# Patient Record
Sex: FEMALE | Race: WHITE | NOT HISPANIC OR LATINO
[De-identification: names, ages, dates, MRNs, and addresses within clinical notes are randomized per-mention and may not be internally consistent; named-entity substitution may affect disease eponyms.]

---

## 2018-07-27 PROBLEM — Z00.00 ENCOUNTER FOR PREVENTIVE HEALTH EXAMINATION: Status: ACTIVE | Noted: 2018-07-27

## 2019-05-20 ENCOUNTER — APPOINTMENT (OUTPATIENT)
Dept: BREAST CENTER | Facility: CLINIC | Age: 59
End: 2019-05-20
Payer: COMMERCIAL

## 2019-05-20 VITALS
DIASTOLIC BLOOD PRESSURE: 78 MMHG | HEART RATE: 61 BPM | SYSTOLIC BLOOD PRESSURE: 113 MMHG | HEIGHT: 62 IN | BODY MASS INDEX: 23.74 KG/M2 | WEIGHT: 129 LBS

## 2019-05-20 DIAGNOSIS — Z86.39 PERSONAL HISTORY OF OTHER ENDOCRINE, NUTRITIONAL AND METABOLIC DISEASE: ICD-10-CM

## 2019-05-20 DIAGNOSIS — Z80.7 FAMILY HISTORY OF OTHER MALIGNANT NEOPLASMS OF LYMPHOID, HEMATOPOIETIC AND RELATED TISSUES: ICD-10-CM

## 2019-05-20 DIAGNOSIS — Z12.31 ENCOUNTER FOR SCREENING MAMMOGRAM FOR MALIGNANT NEOPLASM OF BREAST: ICD-10-CM

## 2019-05-20 PROCEDURE — 99215 OFFICE O/P EST HI 40 MIN: CPT

## 2019-05-20 RX ORDER — FEXOFENADINE HCL 60 MG
CAPSULE ORAL
Refills: 0 | Status: ACTIVE | COMMUNITY

## 2019-05-20 RX ORDER — MONTELUKAST SODIUM 10 MG/1
TABLET, FILM COATED ORAL
Refills: 0 | Status: ACTIVE | COMMUNITY

## 2019-05-20 RX ORDER — OMEPRAZOLE MAGNESIUM 20 MG/1
TABLET, DELAYED RELEASE ORAL
Refills: 0 | Status: ACTIVE | COMMUNITY

## 2019-05-20 RX ORDER — LEVOTHYROXINE SODIUM 137 UG/1
TABLET ORAL
Refills: 0 | Status: ACTIVE | COMMUNITY

## 2019-05-20 NOTE — ASSESSMENT
[FreeTextEntry1] : 60 yo female with h/o LCIS\par due for MRI AUG 2019\par plan mg/sono FEB 2020\par We reviewed risk reduction strategies including maintaining a BMI <25, limiting red meat intake and alcoholic beverages to 3 per week and exercise (150 min/ week low intensity or 75 min/week high intensity). And maintaining a normal vitamin D level.\par \par seeing new gynecologist JUN 2019 she will discuss HRT with her and try alternative methods\par f/u me 6 months \par She knows to call or return sooner should any concerns or questions arise.\par

## 2019-05-20 NOTE — HISTORY OF PRESENT ILLNESS
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 Patient is s/p left breast stereotactic biopsy on 01/05/2017. Pathology showed cystic changes with apocrine metaplasia and associated luminal calcifications and a fibroadenomatoid nodule with scattered calcifications and atrophic lobules with luminal calcifications. She has a "spot on her spine" that is being followed by a doctor in [facility]. She says they don't know what it is. It is in sacrum. She has since seen Dr. Nair and will get another MRI in 2 years. \par \par Her mother had a massive hemorrhagic stroke and is currently stable. Her 87 yo father passed in September 2019.\par \par Patient has no breast complaints today.  \par \par She does SBE irregularly. \par She has not noticed a change in her breast or a breast lump.\par She has not noticed a change in her nipple or nipple area.\par She has not noticed a change in the skin of the breast.\par She is not experiencing nipple discharge.\par She is not experiencing breast pain.\par She has not noticed a lump or lymph node under the armpit. \par \par BREAST CANCER RISK FACTORS\par Menarche:  17\par Date of LMP: 2009 (ablation)\par Menopause: post \par Grav:  3    Para:  3\par Age at first live birth: 27\par Nursed: no\par Hysterectomy: no\par Oophorectomy: no\par OCP: yes 10 years\par HRT: no\par Last pap/pelvic exam: 09/2017  WNL\par Related family history: maternal aunt BCA@80, patrnal 1st cousin @48 \par Ashkenazi: No\par Tyrer-Job/NCI lifetime risk:  BRCAPRO 9.6%, TCv6 49.7%, TCv7 62%, Florina 10%, Wm is not applicable\par BRCA testing: No\par Bra size:  32A\par \par Last mammogram:   2/12/2019         Location: [place]\par Report reviewed.                                 Images reviewed on PACS.\par Results: Birads 2\par Heterogeneously dense breast. No evidence of malignancy \par \par Last ultrasound:      2/12/2019         Location: [place]\par Report reviewed.                                 Images reviewed on PACS.\par Results: Birads 2\par No evidence of malignancy \par \par \par Last MRI:     8/13/2018               Location: [facility]\par Report reviewed.\par Results: Birads 2\par No evidence of malignancy

## 2019-05-20 NOTE — REVIEW OF SYSTEMS
[Feeling Tired] : feeling tired [Recent Weight Gain (___ Lbs)] : recent [unfilled] ~Ulb weight gain [Night Sweats] : night sweats [Nasal Discharge] : nasal discharge [Palpitations] : palpitations [Lower Ext Edema] : lower extremity edema [Heartburn] : heartburn [Anxiety] : anxiety [Heat Intolerance] : intolerance to heat [Negative] : Heme/Lymph [de-identified] : stress

## 2019-05-20 NOTE — PHYSICAL EXAM
[Normocephalic] : normocephalic [Atraumatic] : atraumatic [Supple] : supple [No Supraclavicular Adenopathy] : no supraclavicular adenopathy [Examined in the supine and seated position] : examined in the supine and seated position [No dominant masses] : no dominant masses in right breast  [No dominant masses] : no dominant masses left breast [No Nipple Retraction] : no left nipple retraction [No Nipple Discharge] : no left nipple discharge [No Axillary Lymphadenopathy] : no left axillary lymphadenopathy [No Edema] : no edema [No Rashes] : no rashes [No Ulceration] : no ulceration [de-identified] : healed periareolar incision

## 2020-10-05 ENCOUNTER — APPOINTMENT (OUTPATIENT)
Dept: BREAST CENTER | Facility: CLINIC | Age: 60
End: 2020-10-05
Payer: COMMERCIAL

## 2020-10-05 VITALS
SYSTOLIC BLOOD PRESSURE: 133 MMHG | HEART RATE: 67 BPM | BODY MASS INDEX: 23.74 KG/M2 | WEIGHT: 129 LBS | DIASTOLIC BLOOD PRESSURE: 84 MMHG | HEIGHT: 62 IN

## 2020-10-05 DIAGNOSIS — R92.1 MAMMOGRAPHIC CALCIFICATION FOUND ON DIAGNOSTIC IMAGING OF BREAST: ICD-10-CM

## 2020-10-05 DIAGNOSIS — Z80.3 FAMILY HISTORY OF MALIGNANT NEOPLASM OF BREAST: ICD-10-CM

## 2020-10-05 DIAGNOSIS — Z80.8 FAMILY HISTORY OF MALIGNANT NEOPLASM OF OTHER ORGANS OR SYSTEMS: ICD-10-CM

## 2020-10-05 DIAGNOSIS — Z80.0 FAMILY HISTORY OF MALIGNANT NEOPLASM OF DIGESTIVE ORGANS: ICD-10-CM

## 2020-10-05 DIAGNOSIS — Z78.9 OTHER SPECIFIED HEALTH STATUS: ICD-10-CM

## 2020-10-05 PROCEDURE — 99215 OFFICE O/P EST HI 40 MIN: CPT

## 2020-10-05 RX ORDER — METHYLPREDNISOLONE 32 MG/1
32 TABLET ORAL
Qty: 3 | Refills: 0 | Status: DISCONTINUED | COMMUNITY
Start: 2018-08-09 | End: 2020-10-05

## 2020-10-05 NOTE — REVIEW OF SYSTEMS
[Feeling Tired] : feeling tired [Recent Weight Gain (___ Lbs)] : recent [unfilled] ~Ulb weight gain [Night Sweats] : night sweats [Nasal Discharge] : nasal discharge [Palpitations] : palpitations [Lower Ext Edema] : lower extremity edema [Heartburn] : heartburn [Anxiety] : anxiety [Heat Intolerance] : intolerance to heat [Negative] : Heme/Lymph [de-identified] : stress

## 2020-10-05 NOTE — PHYSICAL EXAM
[Normocephalic] : normocephalic [Atraumatic] : atraumatic [Supple] : supple [No Supraclavicular Adenopathy] : no supraclavicular adenopathy [Examined in the supine and seated position] : examined in the supine and seated position [No dominant masses] : no dominant masses in right breast  [No dominant masses] : no dominant masses left breast [No Nipple Retraction] : no left nipple retraction [No Nipple Discharge] : no left nipple discharge [No Axillary Lymphadenopathy] : no left axillary lymphadenopathy [No Edema] : no edema [No Rashes] : no rashes [No Ulceration] : no ulceration [de-identified] : healed periareolar incision

## 2020-10-05 NOTE — HISTORY OF PRESENT ILLNESS
[FreeTextEntry1] : This is a 60 year old female s/p left partial mastectomy 3/2013 for lobular carcinoma in situ. Patient is s/p left breast stereotactic biopsy on 01/05/2017. Pathology showed cystic changes with apocrine metaplasia and associated luminal calcifications and a fibroadenomatoid nodule with scattered calcifications and atrophic lobules with luminal calcifications. She has a "spot on her spine" that [facility] She says they don't know what it is. It is in sacrum. She has since seen Dr. Nair and will get another MRI in 2 years. \par \par Her mother had a massive hemorrhagic stroke and is currently stable. Her 87 yo father passed in September 2019. Her  passed away 2/2020.\par \par Patient has no breast complaints today.  \par \par She does SBE irregularly. \par She has not noticed a change in her breast or a breast lump.\par She has not noticed a change in her nipple or nipple area.\par She has not noticed a change in the skin of the breast.\par She is not experiencing nipple discharge.\par She is not experiencing breast pain.\par She has not noticed a lump or lymph node under the armpit. \par \par BREAST CANCER RISK FACTORS\par Menarche:  17\par Date of LMP: 2009 (ablation)\par Menopause: post \par Grav:  3    Para:  3\par Age at first live birth: 27\par Nursed: no\par Hysterectomy: no\par Oophorectomy: no\par OCP: yes 10 years\par HRT: no\par Last pap/pelvic exam: 6/2020  WNL\par Related family history: maternal aunt BCA@80, patrnal 1st cousin @48 \par Ashkenazi: No\par Tyrer-[place]/NCI lifetime risk:  BRCAPRO 9.6%, TCv6 49.7%, TCv7 62%, Florina 10%, Wm is not applicable\par BRCA testing: No\par Bra size:  32A\par \par Last mammogram: 9/25/2020 right         Location[place]\par Report reviewed.                                 Images reviewed on PACS.\par Results: Birads 2\par Heterogeneously dense breast. Stable mammographic findings. \par \par Last ultrasound:      2/12/2019         Location: [place]\par Report reviewed.                                 Images reviewed on PACS.\par Results: Birads 2\par No evidence of malignancy \par \par \par Last MRI:     8/05/2020              Location: [facility]\par Report reviewed.\par Results: Birads 2\par No evidence of malignancy.

## 2020-10-05 NOTE — ASSESSMENT
[FreeTextEntry1] : 61 yo female with h/o LCIS\par due for MRI SEP 2021\par plan mg/sono MAR 2021\par We reviewed risk reduction strategies including maintaining a BMI <25, limiting red meat intake and alcoholic beverages to 3 per week and exercise (150 min/ week low intensity or 75 min/week high intensity). And maintaining a normal vitamin D level.\par \par saw Dr. Karimi JUN 2019 and HRT was not advised\par f/u me 6 months \par She knows to call or return sooner should any concerns or questions arise.\par

## 2021-03-19 ENCOUNTER — NON-APPOINTMENT (OUTPATIENT)
Age: 61
End: 2021-03-19

## 2021-03-19 DIAGNOSIS — R92.8 OTHER ABNORMAL AND INCONCLUSIVE FINDINGS ON DIAGNOSTIC IMAGING OF BREAST: ICD-10-CM

## 2021-05-04 ENCOUNTER — NON-APPOINTMENT (OUTPATIENT)
Age: 61
End: 2021-05-04

## 2021-05-04 ENCOUNTER — APPOINTMENT (OUTPATIENT)
Dept: BREAST CENTER | Facility: CLINIC | Age: 61
End: 2021-05-04
Payer: COMMERCIAL

## 2021-05-04 VITALS
HEART RATE: 64 BPM | HEIGHT: 62 IN | BODY MASS INDEX: 23.74 KG/M2 | WEIGHT: 129 LBS | SYSTOLIC BLOOD PRESSURE: 108 MMHG | DIASTOLIC BLOOD PRESSURE: 71 MMHG

## 2021-05-04 PROCEDURE — 99215 OFFICE O/P EST HI 40 MIN: CPT

## 2021-05-04 PROCEDURE — 99072 ADDL SUPL MATRL&STAF TM PHE: CPT

## 2021-05-04 RX ORDER — DOXYCYCLINE HYCLATE 50 MG/1
CAPSULE ORAL
Refills: 0 | Status: DISCONTINUED | COMMUNITY
End: 2021-05-04

## 2021-05-04 NOTE — HISTORY OF PRESENT ILLNESS
[FreeTextEntry1] : This is a 61 year old female s/p left partial mastectomy 3/2013 for lobular carcinoma in situ. Patient is s/p left breast stereotactic biopsy on 01/05/2017. Pathology showed cystic changes with apocrine metaplasia and associated luminal calcifications and a fibroadenomatoid nodule with scattered calcifications and atrophic lobules with luminal calcifications. She has a "spot on her spine" that is being followed by a doctor in [facility]. She says they don't know what it is. It is in sacrum. She has since seen Dr. Nair and will get another MRI in 2 years. \par \par Her mother had a massive hemorrhagic stroke and is currently stable. Her 89 yo father passed in September 2019. Her  passed away 2/2020.\par \par Patient has no breast complaints today.  She completed Pfizer left arm April 2021. She is s/p right breast LIQ stereo bx 3/23/2021 path was benign.\par \par She does SBE irregularly. \par She has not noticed a change in her breast or a breast lump.\par She has not noticed a change in her nipple or nipple area.\par She has not noticed a change in the skin of the breast.\par She is not experiencing nipple discharge.\par She is not experiencing breast pain.\par She has not noticed a lump or lymph node under the armpit. \par \par BREAST CANCER RISK FACTORS\par Menarche:  17\par Date of LMP: 2009 (ablation)\par Menopause: post \par Grav:  3    Para:  3\par Age at first live birth: 27\par Nursed: no\par Hysterectomy: no\par Oophorectomy: no\par OCP: yes 10 years\par HRT: no\par Last pap/pelvic exam: 6/2020 WNL\par Related family history: maternal aunt BCA@80, patrnal 1st cousin @48 \par Ashkenazi: No\par Tyrer-[place]/NCI lifetime risk:  BRCAPRO 9.6%, TCv6 49.7%, TCv7 62%, Florina 10%, Wm is not applicable\par BRCA testing: No\par Bra size:  32A\par \par Last mammogram: 3/18/2021         Location: [place]\par Report reviewed.                                 Images reviewed on PACS.\par Results: Birads 4B\par Heterogeneously dense breast. At the inner aspect of the right breast there is now a 4 x 3 mm circumscribed mass with associated microcalcifications. Stereotactic/tomosynthesis guided biopsy is recc. The microcalcifications at the UOA of the right breast are stable and likely benign. No mammographic evidence of malignancy on the left. \par \par Last ultrasound:   3/18/2021         Location: [place]\par Report reviewed.                                 Images reviewed on PACS.\par Results: Birads 4B\par No sonographic evidence of malignancy. \par \par Last MRI:     8/05/2020              Location: [facility]\par Report reviewed.\par Results: Birads 2\par No evidence of malignancy.

## 2021-05-04 NOTE — PHYSICAL EXAM
[Normocephalic] : normocephalic [Atraumatic] : atraumatic [Supple] : supple [No Supraclavicular Adenopathy] : no supraclavicular adenopathy [Examined in the supine and seated position] : examined in the supine and seated position [No dominant masses] : no dominant masses in right breast  [No dominant masses] : no dominant masses left breast [No Nipple Retraction] : no left nipple retraction [No Nipple Discharge] : no left nipple discharge [No Axillary Lymphadenopathy] : no left axillary lymphadenopathy [No Edema] : no edema [No Rashes] : no rashes [No Ulceration] : no ulceration [Symmetrical] : symmetrical [de-identified] : healed periareolar incision

## 2021-05-04 NOTE — REVIEW OF SYSTEMS
[Feeling Tired] : feeling tired [Recent Weight Gain (___ Lbs)] : recent [unfilled] ~Ulb weight gain [Night Sweats] : night sweats [Nasal Discharge] : nasal discharge [Palpitations] : palpitations [Lower Ext Edema] : lower extremity edema [Heartburn] : heartburn [Anxiety] : anxiety [Heat Intolerance] : intolerance to heat [Negative] : Heme/Lymph [de-identified] : stress

## 2022-09-19 ENCOUNTER — NON-APPOINTMENT (OUTPATIENT)
Age: 62
End: 2022-09-19

## 2022-10-18 ENCOUNTER — RESULT REVIEW (OUTPATIENT)
Age: 62
End: 2022-10-18

## 2023-01-24 ENCOUNTER — APPOINTMENT (OUTPATIENT)
Dept: BREAST CENTER | Facility: CLINIC | Age: 63
End: 2023-01-24
Payer: COMMERCIAL

## 2023-01-24 VITALS
WEIGHT: 129 LBS | BODY MASS INDEX: 23.74 KG/M2 | SYSTOLIC BLOOD PRESSURE: 129 MMHG | DIASTOLIC BLOOD PRESSURE: 80 MMHG | HEART RATE: 63 BPM | HEIGHT: 62 IN

## 2023-01-24 DIAGNOSIS — D05.02 LOBULAR CARCINOMA IN SITU OF LEFT BREAST: ICD-10-CM

## 2023-01-24 PROCEDURE — 99214 OFFICE O/P EST MOD 30 MIN: CPT

## 2023-01-24 NOTE — HISTORY OF PRESENT ILLNESS
[FreeTextEntry1] : This is a 62 year old female s/p left partial mastectomy 3/2013 for lobular carcinoma in situ. Patient is s/p left breast stereotactic biopsy on 01/05/2017. Pathology showed cystic changes with apocrine metaplasia and associated luminal calcifications and a fibroadenomatoid nodule with scattered calcifications and atrophic lobules with luminal calcifications. She has a "spot on her spine" that is being followed by a doctor in [facility]. She says they don't know what it is. It is in sacrum. She has since seen Dr. Nair and will get another MRI in 2 years. \par \par Her mother had a massive hemorrhagic stroke and is currently stable. Her 89 yo father passed in September 2019. Her  passed away 2/2020.\par \par Patient has no breast complaints today.  She completed Pfizer left arm April 2021. She is s/p right breast LIQ stereo bx 3/23/2021 path was benign.\par \par She does SBE irregularly. \par She has not noticed a change in her breast or a breast lump.\par She has not noticed a change in her nipple or nipple area.\par She has not noticed a change in the skin of the breast.\par She is not experiencing nipple discharge.\par She is not experiencing breast pain.\par She has not noticed a lump or lymph node under the armpit. \par \par BREAST CANCER RISK FACTORS\par Menarche:  17\par Date of LMP: 2009 (ablation)\par Menopause: post \par Grav:  3    Para:  3\par Age at first live birth: 27\par Nursed: no\par Hysterectomy: no\par Oophorectomy: no\par OCP: yes 10 years\par HRT: no\par Last pap/pelvic exam: 6/2022 WNL\par Related family history: maternal aunt BCA@80, patrnal 1st cousin @48 \par Ashkenazi: No\par Tyrer-Job/NCI lifetime risk:  BRCAPRO 9.6%, TCv6 49.7%, TCv7 62%, Florina 10%, Wm is not applicable\par BRCA testing: No\par Bra size:  32A\par \par Last mammogram: 3/23/2022              Location[place]\par Report reviewed.                                 Images reviewed on PACS.\par Results: Birads 2\par Heterogeneously dense breast. No evidence of malignancy.\par \par Last ultrasound:   3/23/2022                 Location: [place]\par Report reviewed.                                 Images reviewed on PACS.\par Results: Birads 2\par No sonographic evidence of malignancy. \par \par Last MRI: 10/19/2022             Location: [facility]\par Report reviewed.\par Results: Birads 2\par No MRI evidence of malignancy.

## 2023-01-24 NOTE — PHYSICAL EXAM
[Normocephalic] : normocephalic [Atraumatic] : atraumatic [Supple] : supple [No Supraclavicular Adenopathy] : no supraclavicular adenopathy [Examined in the supine and seated position] : examined in the supine and seated position [Symmetrical] : symmetrical [No dominant masses] : no dominant masses in right breast  [No dominant masses] : no dominant masses left breast [No Nipple Retraction] : no left nipple retraction [No Nipple Discharge] : no left nipple discharge [No Axillary Lymphadenopathy] : no left axillary lymphadenopathy [No Edema] : no edema [No Rashes] : no rashes [No Ulceration] : no ulceration [de-identified] : healed periareolar incision

## 2023-01-24 NOTE — CONSULT LETTER
[Dear  ___] : Dear  [unfilled], [Courtesy Letter:] : I had the pleasure of seeing your patient, [unfilled], in my office today. [Please see my note below.] : Please see my note below. [Consult Closing:] : Thank you very much for allowing me to participate in the care of this patient.  If you have any questions, please do not hesitate to contact me. [Sincerely,] : Sincerely, [FreeTextEntry3] : Cally Cook MS DO\par Breast Surgeon\par [facility] \[place] [address]\par  [DrClaudia  ___] : Dr. RAYMOND

## 2023-01-24 NOTE — ASSESSMENT
[FreeTextEntry1] : 61 yo female with h/o LCIS\par due for MRI OCT 2023\par plan mg/sono Apr 2023\par We reviewed risk reduction strategies including maintaining a BMI <25, limiting red meat intake and alcoholic beverages to 3 per week and exercise (150 min/ week low intensity or 75 min/week high intensity). And maintaining a normal vitamin D level.\par \par saw Dr. Karimi JUN 2019 and HRT was not advised\par f/u me 6 months \par She knows to call or return sooner should any concerns or questions arise.\par

## 2023-01-24 NOTE — REVIEW OF SYSTEMS
[Feeling Tired] : feeling tired [Recent Weight Gain (___ Lbs)] : recent [unfilled] ~Ulb weight gain [Night Sweats] : night sweats [Nasal Discharge] : nasal discharge [Palpitations] : palpitations [Lower Ext Edema] : lower extremity edema [Heartburn] : heartburn [Anxiety] : anxiety [Heat Intolerance] : intolerance to heat [Negative] : Heme/Lymph [de-identified] : stress

## 2023-04-06 ENCOUNTER — RESULT REVIEW (OUTPATIENT)
Age: 63
End: 2023-04-06

## 2023-10-05 ENCOUNTER — NON-APPOINTMENT (OUTPATIENT)
Age: 63
End: 2023-10-05

## 2023-10-19 ENCOUNTER — RESULT REVIEW (OUTPATIENT)
Age: 63
End: 2023-10-19

## 2023-10-25 ENCOUNTER — NON-APPOINTMENT (OUTPATIENT)
Age: 63
End: 2023-10-25

## 2024-02-05 ENCOUNTER — TRANSCRIPTION ENCOUNTER (OUTPATIENT)
Age: 64
End: 2024-02-05

## 2024-02-05 ENCOUNTER — APPOINTMENT (OUTPATIENT)
Dept: BREAST CENTER | Facility: CLINIC | Age: 64
End: 2024-02-05
Payer: COMMERCIAL

## 2024-02-05 VITALS
HEART RATE: 66 BPM | WEIGHT: 130 LBS | SYSTOLIC BLOOD PRESSURE: 126 MMHG | DIASTOLIC BLOOD PRESSURE: 76 MMHG | BODY MASS INDEX: 23.92 KG/M2 | HEIGHT: 62 IN

## 2024-02-05 DIAGNOSIS — Z91.89 OTHER SPECIFIED PERSONAL RISK FACTORS, NOT ELSEWHERE CLASSIFIED: ICD-10-CM

## 2024-02-05 DIAGNOSIS — Z91.041 RADIOGRAPHIC DYE ALLERGY STATUS: ICD-10-CM

## 2024-02-05 DIAGNOSIS — R92.30 DENSE BREASTS, UNSPECIFIED: ICD-10-CM

## 2024-02-05 DIAGNOSIS — Z12.31 ENCOUNTER FOR SCREENING MAMMOGRAM FOR MALIGNANT NEOPLASM OF BREAST: ICD-10-CM

## 2024-02-05 PROCEDURE — 99214 OFFICE O/P EST MOD 30 MIN: CPT

## 2024-02-05 RX ORDER — CETIRIZINE HCL 10 MG
TABLET ORAL
Refills: 0 | Status: DISCONTINUED | COMMUNITY
End: 2024-02-05

## 2024-02-05 NOTE — REVIEW OF SYSTEMS
[Feeling Tired] : feeling tired [Recent Weight Gain (___ Lbs)] : recent [unfilled] ~Ulb weight gain [Night Sweats] : night sweats [Nasal Discharge] : nasal discharge [Palpitations] : palpitations [Lower Ext Edema] : lower extremity edema [Heartburn] : heartburn [Anxiety] : anxiety [Heat Intolerance] : intolerance to heat [Negative] : Heme/Lymph [de-identified] : stress

## 2024-02-05 NOTE — HISTORY OF PRESENT ILLNESS
[FreeTextEntry1] : This is a 63 year old female s/p left partial mastectomy 3/2013 for lobular carcinoma in situ. Patient is s/p left breast stereotactic biopsy on 01/05/2017. Pathology showed cystic changes with apocrine metaplasia and associated luminal calcifications and a fibroadenomatoid nodule with scattered calcifications and atrophic lobules with luminal calcifications. She has a "spot on her spine" that is being followed by a doctor in [facility]. She says they don't know what it is. It is in sacrum. She has since seen Dr. Nair and will get another MRI in 2 years.   Her mother had a massive hemorrhagic stroke and is currently stable. Her 87 yo father passed in September 2019. Her  passed away 2/2020.  Patient has no breast complaints today.  She completed Pfizer left arm April 2021. She is s/p right breast LIQ stereo bx 3/23/2021 path was benign.  She does SBE irregularly.  She has not noticed a change in her breast or a breast lump. She has not noticed a change in her nipple or nipple area. She has not noticed a change in the skin of the breast. She is not experiencing nipple discharge. She is not experiencing breast pain. She has not noticed a lump or lymph node under the armpit.   BREAST CANCER RISK FACTORS Menarche:  17 Date of LMP: 2009 (ablation) Menopause: post  Grav:  3    Para:  3 Age at first live birth: 27 Nursed: no Hysterectomy: no Oophorectomy: no OCP: yes 10 years HRT: no Last pap/pelvic exam: 2023 WNL Related family history: maternal aunt BCA@80, patrnal 1st cousin @48  Ashkenazi: No Tyrer-[place]/NCI lifetime risk:  BRCAPRO 9.6%, TCv6 49.7%, TCv7 62%, Florina 10%, Wm is not applicable BRCA testing: No Bra size:  32A  Last mammogram: 04/07/2023             Location: [place] Report reviewed.                                 Images reviewed on PACS. Results: Birads 2 Heterogeneously dense breast. No evidence of malignancy.  Last ultrasound:   10/20/2023                Location: [place] Report reviewed.                                 Images reviewed on PACS. Results: Birads 2 No sonographic evidence of malignancy.   Last MRI: 10/20/2023            Location: [facility] Report reviewed. Results: Birads 2 No MRI evidence of malignancy.

## 2024-02-05 NOTE — CONSULT LETTER
[Dear  ___] : Dear  [unfilled], [Courtesy Letter:] : I had the pleasure of seeing your patient, [unfilled], in my office today. [Please see my note below.] : Please see my note below. [Consult Closing:] : Thank you very much for allowing me to participate in the care of this patient.  If you have any questions, please do not hesitate to contact me. [Sincerely,] : Sincerely, [DrClaudia  ___] : Dr. RAYMOND [FreeTextEntry3] : Cally Cook MS DO\par  Breast Surgeon\par  [facility] \[place] [address]\par

## 2024-02-05 NOTE — ASSESSMENT
[FreeTextEntry1] : 64 yo female with h/o LCIS due for MRI OCT 2024 plan mg/sono Apr 2024 discussed [place] As another high risk screening regimen will discuss again next year We reviewed risk reduction strategies including maintaining a BMI <25, limiting red meat intake and alcoholic beverages to 3 per week and exercise (150 min/ week low intensity or 75 min/week high intensity). And maintaining a normal vitamin D level.  f/u me 1 year as long as imaging negative She knows to call or return sooner should any concerns or questions arise.

## 2024-02-05 NOTE — PHYSICAL EXAM
[Normocephalic] : normocephalic [Atraumatic] : atraumatic [Supple] : supple [No Supraclavicular Adenopathy] : no supraclavicular adenopathy [Examined in the supine and seated position] : examined in the supine and seated position [Symmetrical] : symmetrical [No dominant masses] : no dominant masses in right breast  [No dominant masses] : no dominant masses left breast [No Nipple Retraction] : no left nipple retraction [No Nipple Discharge] : no left nipple discharge [No Axillary Lymphadenopathy] : no left axillary lymphadenopathy [No Edema] : no edema [No Rashes] : no rashes [No Ulceration] : no ulceration [de-identified] : healed periareolar incision

## 2024-04-10 ENCOUNTER — RESULT REVIEW (OUTPATIENT)
Age: 64
End: 2024-04-10

## 2024-10-16 ENCOUNTER — NON-APPOINTMENT (OUTPATIENT)
Age: 64
End: 2024-10-16

## 2024-10-21 ENCOUNTER — RESULT REVIEW (OUTPATIENT)
Age: 64
End: 2024-10-21

## 2024-10-21 DIAGNOSIS — R92.2 INCONCLUSIVE MAMMOGRAM: ICD-10-CM

## 2024-10-21 DIAGNOSIS — R92.30 INCONCLUSIVE MAMMOGRAM: ICD-10-CM

## 2024-10-21 DIAGNOSIS — R92.30 DENSE BREASTS, UNSPECIFIED: ICD-10-CM

## 2025-03-10 ENCOUNTER — APPOINTMENT (OUTPATIENT)
Dept: BREAST CENTER | Facility: CLINIC | Age: 65
End: 2025-03-10
Payer: COMMERCIAL

## 2025-03-10 VITALS
WEIGHT: 130 LBS | SYSTOLIC BLOOD PRESSURE: 122 MMHG | HEART RATE: 52 BPM | DIASTOLIC BLOOD PRESSURE: 73 MMHG | BODY MASS INDEX: 23.92 KG/M2 | HEIGHT: 62 IN

## 2025-03-10 DIAGNOSIS — Z91.89 OTHER SPECIFIED PERSONAL RISK FACTORS, NOT ELSEWHERE CLASSIFIED: ICD-10-CM

## 2025-03-10 DIAGNOSIS — R92.30 INCONCLUSIVE MAMMOGRAM: ICD-10-CM

## 2025-03-10 DIAGNOSIS — D05.02 LOBULAR CARCINOMA IN SITU OF LEFT BREAST: ICD-10-CM

## 2025-03-10 DIAGNOSIS — Z12.31 ENCOUNTER FOR SCREENING MAMMOGRAM FOR MALIGNANT NEOPLASM OF BREAST: ICD-10-CM

## 2025-03-10 DIAGNOSIS — R92.2 INCONCLUSIVE MAMMOGRAM: ICD-10-CM

## 2025-03-10 PROCEDURE — 99214 OFFICE O/P EST MOD 30 MIN: CPT

## 2025-03-10 RX ORDER — MULTIVITAMIN
TABLET ORAL
Refills: 0 | Status: ACTIVE | COMMUNITY

## 2025-03-10 RX ORDER — METOPROLOL TARTRATE 75 MG/1
TABLET, FILM COATED ORAL
Refills: 0 | Status: ACTIVE | COMMUNITY

## 2025-03-10 RX ORDER — LEVOCETIRIZINE DIHYDROCHLORIDE 5 MG/1
TABLET, FILM COATED ORAL
Refills: 0 | Status: ACTIVE | COMMUNITY

## 2025-03-10 RX ORDER — LIFITEGRAST 50 MG/ML
SOLUTION/ DROPS OPHTHALMIC
Refills: 0 | Status: ACTIVE | COMMUNITY

## 2025-04-15 ENCOUNTER — RESULT REVIEW (OUTPATIENT)
Age: 65
End: 2025-04-15

## 2025-07-24 NOTE — REASON FOR VISIT
Called patient to discuss surgery. No answer and voicemail is full. Surgery packet sent through [employer].    Jane Rubin RN     [Follow-Up: _____] : a [unfilled] follow-up visit